# Patient Record
Sex: MALE | Race: BLACK OR AFRICAN AMERICAN | Employment: OTHER | URBAN - METROPOLITAN AREA
[De-identification: names, ages, dates, MRNs, and addresses within clinical notes are randomized per-mention and may not be internally consistent; named-entity substitution may affect disease eponyms.]

---

## 2017-03-21 ENCOUNTER — PATIENT OUTREACH (OUTPATIENT)
Dept: INTERNAL MEDICINE CLINIC | Age: 82
End: 2017-03-21

## 2017-03-21 NOTE — PROGRESS NOTES
Spoke with patient he verified identity with /ADDRESS. Patient listed with [employer] for HEDIS compliance. Patient notified Panel Manager he is being seen at [facility] ([facility]) AT [place] for a wound that is not healing well. Patient informed of need to be seen in PCP office to complete form. Patient stated he only sees [place] physicians for certain things and Dr. Sophia Bansal for certain things. Patient stated he thought [employer] was not an insurance. Patient encouraged to call [facility] for more information on his plan. Patient wants to be seen at the [place] for his next appointment then call PCP office to schedule appointment. Patient informed of importance to complete the needed appointment. Will attempt to get patient to schedule appointment as soon as possible.

## 2017-07-04 ENCOUNTER — APPOINTMENT (OUTPATIENT)
Dept: GENERAL RADIOLOGY | Age: 82
DRG: 292 | End: 2017-07-04
Attending: EMERGENCY MEDICINE
Payer: MEDICARE

## 2017-07-04 ENCOUNTER — APPOINTMENT (OUTPATIENT)
Dept: GENERAL RADIOLOGY | Age: 82
DRG: 292 | End: 2017-07-04
Attending: PHYSICIAN ASSISTANT
Payer: MEDICARE

## 2017-07-04 ENCOUNTER — HOSPITAL ENCOUNTER (INPATIENT)
Age: 82
LOS: 2 days | Discharge: HOME | DRG: 292 | End: 2017-07-06
Attending: EMERGENCY MEDICINE | Admitting: EMERGENCY MEDICINE
Payer: MEDICARE

## 2017-07-04 DIAGNOSIS — R06.03 RESPIRATORY DISTRESS: Primary | ICD-10-CM

## 2017-07-04 DIAGNOSIS — L03.119 RECURRENT CELLULITIS OF LOWER LEG: ICD-10-CM

## 2017-07-04 DIAGNOSIS — I50.9 ACUTE CONGESTIVE HEART FAILURE, UNSPECIFIED CONGESTIVE HEART FAILURE TYPE: ICD-10-CM

## 2017-07-04 DIAGNOSIS — I87.2 CHRONIC VENOUS INSUFFICIENCY: ICD-10-CM

## 2017-07-04 DIAGNOSIS — I10 MALIGNANT HYPERTENSION: ICD-10-CM

## 2017-07-04 LAB
ALBUMIN SERPL BCP-MCNC: 3.1 G/DL (ref 3.5–5)
ALBUMIN/GLOB SERPL: 0.7 {RATIO} (ref 1.1–2.2)
ALP SERPL-CCNC: 88 U/L (ref 45–117)
ALT SERPL-CCNC: 24 U/L (ref 12–78)
ANION GAP BLD CALC-SCNC: 6 MMOL/L (ref 5–15)
AST SERPL W P-5'-P-CCNC: 17 U/L (ref 15–37)
BASOPHILS # BLD AUTO: 0.1 K/UL (ref 0–0.1)
BASOPHILS # BLD: 1 % (ref 0–1)
BILIRUB SERPL-MCNC: 0.3 MG/DL (ref 0.2–1)
BNP SERPL-MCNC: 1109 PG/ML (ref 0–450)
BUN SERPL-MCNC: 13 MG/DL (ref 6–20)
BUN/CREAT SERPL: 10 (ref 12–20)
CALCIUM SERPL-MCNC: 8 MG/DL (ref 8.5–10.1)
CHLORIDE SERPL-SCNC: 103 MMOL/L (ref 97–108)
CK MB CFR SERPL CALC: 0.7 % (ref 0–2.5)
CK MB SERPL-MCNC: 1.4 NG/ML (ref 5–25)
CK SERPL-CCNC: 192 U/L (ref 39–308)
CO2 SERPL-SCNC: 32 MMOL/L (ref 21–32)
CREAT SERPL-MCNC: 1.24 MG/DL (ref 0.7–1.3)
EOSINOPHIL # BLD: 0.3 K/UL (ref 0–0.4)
EOSINOPHIL NFR BLD: 5 % (ref 0–7)
ERYTHROCYTE [DISTWIDTH] IN BLOOD BY AUTOMATED COUNT: 18.1 % (ref 11.5–14.5)
GLOBULIN SER CALC-MCNC: 4.7 G/DL (ref 2–4)
GLUCOSE SERPL-MCNC: 104 MG/DL (ref 65–100)
HCT VFR BLD AUTO: 35.7 % (ref 36.6–50.3)
HGB BLD-MCNC: 10.7 G/DL (ref 12.1–17)
LYMPHOCYTES # BLD AUTO: 27 % (ref 12–49)
LYMPHOCYTES # BLD: 1.6 K/UL (ref 0.8–3.5)
MCH RBC QN AUTO: 23.4 PG (ref 26–34)
MCHC RBC AUTO-ENTMCNC: 30 G/DL (ref 30–36.5)
MCV RBC AUTO: 78.1 FL (ref 80–99)
MONOCYTES # BLD: 0.6 K/UL (ref 0–1)
MONOCYTES NFR BLD AUTO: 9 % (ref 5–13)
NEUTS SEG # BLD: 3.6 K/UL (ref 1.8–8)
NEUTS SEG NFR BLD AUTO: 58 % (ref 32–75)
PLATELET # BLD AUTO: 294 K/UL (ref 150–400)
POTASSIUM SERPL-SCNC: 3.9 MMOL/L (ref 3.5–5.1)
PROT SERPL-MCNC: 7.8 G/DL (ref 6.4–8.2)
RBC # BLD AUTO: 4.57 M/UL (ref 4.1–5.7)
SODIUM SERPL-SCNC: 141 MMOL/L (ref 136–145)
TROPONIN I SERPL-MCNC: <0.04 NG/ML
WBC # BLD AUTO: 6.1 K/UL (ref 4.1–11.1)

## 2017-07-04 PROCEDURE — 74011250637 HC RX REV CODE- 250/637: Performed by: EMERGENCY MEDICINE

## 2017-07-04 PROCEDURE — 96376 TX/PRO/DX INJ SAME DRUG ADON: CPT

## 2017-07-04 PROCEDURE — 96374 THER/PROPH/DIAG INJ IV PUSH: CPT

## 2017-07-04 PROCEDURE — 84484 ASSAY OF TROPONIN QUANT: CPT | Performed by: PHYSICIAN ASSISTANT

## 2017-07-04 PROCEDURE — 71010 XR CHEST PORT: CPT

## 2017-07-04 PROCEDURE — 65660000000 HC RM CCU STEPDOWN

## 2017-07-04 PROCEDURE — 85025 COMPLETE CBC W/AUTO DIFF WBC: CPT | Performed by: PHYSICIAN ASSISTANT

## 2017-07-04 PROCEDURE — 82550 ASSAY OF CK (CPK): CPT | Performed by: PHYSICIAN ASSISTANT

## 2017-07-04 PROCEDURE — 74011250636 HC RX REV CODE- 250/636: Performed by: EMERGENCY MEDICINE

## 2017-07-04 PROCEDURE — 80053 COMPREHEN METABOLIC PANEL: CPT | Performed by: PHYSICIAN ASSISTANT

## 2017-07-04 PROCEDURE — 83880 ASSAY OF NATRIURETIC PEPTIDE: CPT | Performed by: PHYSICIAN ASSISTANT

## 2017-07-04 PROCEDURE — 93005 ELECTROCARDIOGRAM TRACING: CPT

## 2017-07-04 PROCEDURE — 99285 EMERGENCY DEPT VISIT HI MDM: CPT

## 2017-07-04 PROCEDURE — 36415 COLL VENOUS BLD VENIPUNCTURE: CPT | Performed by: EMERGENCY MEDICINE

## 2017-07-04 PROCEDURE — 74011250636 HC RX REV CODE- 250/636: Performed by: PHYSICIAN ASSISTANT

## 2017-07-04 PROCEDURE — 74011250637 HC RX REV CODE- 250/637: Performed by: PHYSICIAN ASSISTANT

## 2017-07-04 RX ORDER — ACETAMINOPHEN 325 MG/1
650 TABLET ORAL
Status: DISCONTINUED | OUTPATIENT
Start: 2017-07-04 | End: 2017-07-06 | Stop reason: HOSPADM

## 2017-07-04 RX ORDER — FUROSEMIDE 10 MG/ML
40 INJECTION INTRAMUSCULAR; INTRAVENOUS
Status: COMPLETED | OUTPATIENT
Start: 2017-07-04 | End: 2017-07-04

## 2017-07-04 RX ORDER — SODIUM CHLORIDE 0.9 % (FLUSH) 0.9 %
5-10 SYRINGE (ML) INJECTION AS NEEDED
Status: DISCONTINUED | OUTPATIENT
Start: 2017-07-04 | End: 2017-07-05 | Stop reason: SDUPTHER

## 2017-07-04 RX ORDER — SODIUM CHLORIDE 0.9 % (FLUSH) 0.9 %
5-10 SYRINGE (ML) INJECTION AS NEEDED
Status: DISCONTINUED | OUTPATIENT
Start: 2017-07-04 | End: 2017-07-06 | Stop reason: HOSPADM

## 2017-07-04 RX ORDER — LISINOPRIL 5 MG/1
10 TABLET ORAL DAILY
Status: DISCONTINUED | OUTPATIENT
Start: 2017-07-05 | End: 2017-07-06

## 2017-07-04 RX ORDER — FUROSEMIDE 10 MG/ML
40 INJECTION INTRAMUSCULAR; INTRAVENOUS 2 TIMES DAILY
Status: DISCONTINUED | OUTPATIENT
Start: 2017-07-04 | End: 2017-07-06 | Stop reason: HOSPADM

## 2017-07-04 RX ORDER — SODIUM CHLORIDE 0.9 % (FLUSH) 0.9 %
5-10 SYRINGE (ML) INJECTION EVERY 8 HOURS
Status: DISCONTINUED | OUTPATIENT
Start: 2017-07-04 | End: 2017-07-05 | Stop reason: SDUPTHER

## 2017-07-04 RX ORDER — ONDANSETRON 2 MG/ML
4 INJECTION INTRAMUSCULAR; INTRAVENOUS
Status: DISCONTINUED | OUTPATIENT
Start: 2017-07-04 | End: 2017-07-06 | Stop reason: HOSPADM

## 2017-07-04 RX ORDER — LATANOPROST 50 UG/ML
1 SOLUTION/ DROPS OPHTHALMIC
Status: DISCONTINUED | OUTPATIENT
Start: 2017-07-04 | End: 2017-07-06 | Stop reason: HOSPADM

## 2017-07-04 RX ORDER — METHADONE HYDROCHLORIDE 10 MG/1
10 TABLET ORAL 2 TIMES DAILY
Status: DISCONTINUED | OUTPATIENT
Start: 2017-07-04 | End: 2017-07-05

## 2017-07-04 RX ORDER — GUAIFENESIN 100 MG/5ML
200 SOLUTION ORAL
Status: DISPENSED | OUTPATIENT
Start: 2017-07-04 | End: 2017-07-05

## 2017-07-04 RX ORDER — GUAIFENESIN 100 MG/5ML
200 SOLUTION ORAL
Status: DISCONTINUED | OUTPATIENT
Start: 2017-07-04 | End: 2017-07-06 | Stop reason: HOSPADM

## 2017-07-04 RX ORDER — SODIUM CHLORIDE 0.9 % (FLUSH) 0.9 %
5-10 SYRINGE (ML) INJECTION EVERY 8 HOURS
Status: DISCONTINUED | OUTPATIENT
Start: 2017-07-04 | End: 2017-07-06 | Stop reason: HOSPADM

## 2017-07-04 RX ADMIN — GUAIFENESIN 200 MG: 100 SOLUTION ORAL at 23:01

## 2017-07-04 RX ADMIN — FUROSEMIDE 40 MG: 10 INJECTION, SOLUTION INTRAVENOUS at 18:48

## 2017-07-04 RX ADMIN — NITROGLYCERIN 1 INCH: 20 OINTMENT TOPICAL at 18:13

## 2017-07-04 RX ADMIN — ACETAMINOPHEN 650 MG: 325 TABLET ORAL at 22:16

## 2017-07-04 RX ADMIN — Medication 5 ML: at 22:19

## 2017-07-04 RX ADMIN — FUROSEMIDE 40 MG: 10 INJECTION, SOLUTION INTRAVENOUS at 18:13

## 2017-07-04 RX ADMIN — METHADONE HYDROCHLORIDE 10 MG: 10 TABLET ORAL at 22:15

## 2017-07-04 NOTE — IP AVS SNAPSHOT
[facility] 
 
 
 [address] Patient: Cliff Barron MRN: [id number] QXB:[birth year] You are allergic to the following Allergen Reactions Ace Inhibitors Cough Ibuprofen Rash Recent Documentation Height Weight BMI Smoking Status 1.702 m 118.3 kg 40.83 kg/m2 Never Smoker Emergency Contacts Name Discharge Info Relation Home Work Mobile [address]  Son [22] [phone] Yaya Beltre  Sister [23] [phone] Sandra Carpenter  Other Relative [6] [phone] About your hospitalization You were admitted on:  July 4, 2017 You last received care in the:  [address] You were discharged on:  July 6, 2017 Unit phone number:  [phone] Why you were hospitalized Your primary diagnosis was:  Chf (Congestive Heart Failure) (Hcc) Your diagnoses also included:  Htn (Hypertension), Obesity, Diabetes Mellitus (Hcc) Providers Seen During Your Hospitalizations Provider Role Specialty Primary office phone Miroslava Houston MD Attending Provider Emergency Medicine [phone] Jero Hein MD Attending Provider Emergency Medicine [phone] Carrie Weiss MD Attending Provider Internal Medicine [phone] Your Primary Care Physician (PCP) Primary Care Physician Office Phone Office Fax Helen Howard [phone] Follow-up Information Follow up With Details Comments Contact Info Audrey Fortune NP Go on 7/10/2017 Your appointment is scheduled for 7/10/17 at [place] [address] [facility] [id number] 
[phone] [facility] Office on [address] [facility]. will be contacted by a health  to schedule a home visit. [address] [facility] will be contacted with your [facility] and [facility] Medicine appointments within 48 hours. [address] [facility] [address] 
[phone] Donovan Wojciech on 7/13/2017 Your appointment is scheduled for 7/13/17 at 1:15pm. [address] [facility] [address] 
[phone] Teri Astorga MD   [facility] [address] [address] 
[phone] Your Appointments Monday July 10, 2017  9:00 AM EDT ACUTE CARE with Higinio Bowie NP  
[facility] [address] [facility] [address] [address]  
[phone] Current Discharge Medication List  
  
START taking these medications Dose & Instructions Dispensing Information Comments Morning Noon Evening Bedtime  
 losartan 50 mg tablet Commonly known as:  COZAAR Your last dose was: Your next dose is:    
   
   
 Dose:  50 mg Take 1 Tab by mouth daily. Quantity:  30 Tab Refills:  1 CONTINUE these medications which have NOT CHANGED Dose & Instructions Dispensing Information Comments Morning Noon Evening Bedtime ALOE VESTA 43 % Oint Generic drug:  White Petrolatum Your last dose was: Your next dose is:    
   
   
 by Apply Externally route daily as needed (rash). Refills:  0  
     
   
   
   
  
 aspirin 81 mg chewable tablet Your last dose was: Your next dose is:    
   
   
 Dose:  81 mg Take 81 mg by mouth daily. Refills:  0  
     
   
   
   
  
 cholecalciferol 1,000 unit tablet Commonly known as:  VITAMIN D3 Your last dose was: Your next dose is:    
   
   
 Dose:  3000 Units Take 3,000 Units by mouth daily. Refills:  0  
     
   
   
   
  
 ferrous sulfate 325 mg (65 mg iron) tablet Your last dose was: Your next dose is:    
   
   
 Dose:  325 mg Take 325 mg by mouth daily. Refills:  0  
     
   
   
   
  
 furosemide 40 mg tablet Commonly known as:  LASIX Your last dose was: Your next dose is:    
   
   
 Dose:  40 mg Take 40 mg by mouth daily. Refills:  0  
     
   
   
   
  
 gabapentin 600 mg tablet Commonly known as:  NEURONTIN Your last dose was: Your next dose is:    
   
   
 Dose:  600 mg Take 600 mg by mouth nightly. Refills:  0  
     
   
   
   
  
 latanoprost 0.005 % ophthalmic solution Commonly known as:  Jose Tavares Your last dose was: Your next dose is:    
   
   
 Dose:  1 Drop Administer 1 drop to both eyes nightly. Refills:  0  
     
   
   
   
  
 methadone 10 mg tablet Commonly known as:  DOLOPHINE Your last dose was: Your next dose is:    
   
   
 Dose:  20 mg Take 20 mg by mouth two (2) times a day. Refills:  0  
     
   
   
   
  
 OTHER(NON-FORMULARY) Your last dose was: Your next dose is:    
   
   
 Apply  to affected area daily. Sodium lactate-urea 5% Refills:  0  
     
   
   
   
  
 polyvinyl alcohol 1.4 % ophthalmic solution Commonly known as:  Ledell [place] Your last dose was: Your next dose is:    
   
   
 Dose:  1 Drop Administer 1 Drop to both eyes four (4) times daily as needed (dry eye). Refills:  0 PRAVACHOL 40 mg tablet Generic drug:  pravastatin Your last dose was: Your next dose is:    
   
   
 Dose:  40 mg Take 40 mg by mouth nightly. Refills:  0 STOP taking these medications   
 hydrOXYzine pamoate 25 mg capsule Commonly known as:  VISTARIL  
   
  
 lisinopril 40 mg tablet Commonly known as:  Leydi Kwon Where to Get Your Medications Information on where to get these meds will be given to you by the nurse or doctor. ! Ask your nurse or doctor about these medications  
  losartan 50 mg tablet Discharge Instructions None Discharge Instructions Attachments/References HEART FAILURE (ENGLISH) Discharge Orders None WeLike Announcement We are excited to announce that we are making your provider's discharge notes available to you in WeLike. You will see these notes when they are completed and signed by the physician that discharged you from your recent hospital stay. If you have any questions or concerns about any information you see in WeLike, please call the Health Information Department where you were seen or reach out to your Primary Care Provider for more information about your plan of care. Introducing [facility]! Manuel Lezama introduces WeLike patient portal. Now you can access parts of your medical record, email your doctor's office, and request medication refills online. 1. In your internet browser, go to [email or web address] 2. Click on the First Time User? Click Here link in the Sign In box. You will see the New Member Sign Up page. 3. Enter your WeLike Access Code exactly as it appears below. You will not need to use this code after youve completed the sign-up process. If you do not sign up before the expiration date, you must request a new code. · WeLike Access Code: [email or web address] Expires: 10/4/2017  3:33 PM 
 
4. Enter the last four digits of your Social Security Number (xxxx) and Date of Birth (mm/dd/yyyy) as indicated and click Submit. You will be taken to the next sign-up page. 5. Create a WeLike ID. This will be your WeLike login ID and cannot be changed, so think of one that is secure and easy to remember. 6. Create a WeLike password. You can change your password at any time. 7. Enter your Password Reset Question and Answer. This can be used at a later time if you forget your password. 8. Enter your e-mail address. You will receive e-mail notification when new information is available in [address] 9. Click Sign Up. You can now view and download portions of your medical record. 10. Click the Download Summary menu link to download a portable copy of your medical information. If you have questions, please visit the Frequently Asked Questions section of the [employer] website. Remember, [employer] is NOT to be used for urgent needs. For medical emergencies, dial 911. Now available from your iPhone and Android! General Information Please provide this summary of care documentation to your next provider. Patient Signature:  ____________________________________________________________ Date:  ____________________________________________________________  
  
Temitope Garza Provider Signature:  ____________________________________________________________ Date:  ____________________________________________________________ More Information Heart Failure: Care Instructions Your Care Instructions Heart failure occurs when your heart does not pump as much blood as the body needs. Failure does not mean that the heart has stopped pumping but rather that it is not pumping as well as it should. Over time, this causes fluid buildup in your lungs and other parts of your body. Fluid buildup can cause shortness of breath, fatigue, swollen ankles, and other problems. By taking medicines regularly, reducing sodium (salt) in your diet, checking your weight every day, and making lifestyle changes, you can feel better and live longer. Follow-up care is a key part of your treatment and safety. Be sure to make and go to all appointments, and call your doctor if you are having problems. It's also a good idea to know your test results and keep a list of the medicines you take. How can you care for yourself at home? Medicines · Be safe with medicines. Take your medicines exactly as prescribed. Call your doctor if you think you are having a problem with your medicine. · Do not take any vitamins, over-the-counter medicine, or herbal products without talking to your doctor first. Annette Dial not take ibuprofen (Advil or Motrin) and naproxen (Aleve) without talking to your doctor first. They could make your heart failure worse. · You may be taking some of the following medicine. ¨ Beta-blockers can slow heart rate, decrease blood pressure, and improve your condition. Taking a beta-blocker may lower your chance of needing to be hospitalized. ¨ Angiotensin-converting enzyme inhibitors (ACEIs) reduce the heart's workload, lower blood pressure, and reduce swelling. Taking an ACEI may lower your chance of needing to be hospitalized again. ¨ Angiotensin II receptor blockers (ARBs) work like ACEIs. Your doctor may prescribe them instead of ACEIs. ¨ Diuretics, also called water pills, reduce swelling. ¨ Potassium supplements replace this important mineral, which is sometimes lost with diuretics. ¨ Aspirin and other blood thinners prevent blood clots, which can cause a stroke or heart attack. You will get more details on the specific medicines your doctor prescribes. Diet · Your doctor may suggest that you limit sodium to 2,000 milligrams (mg) a day or less. That is less than 1 teaspoon of salt a day, including all the salt you eat in cooking or in packaged foods. People get most of their sodium from processed foods. Fast food and restaurant meals also tend to be very high in sodium. · Ask your doctor how much liquid you can drink each day. You may have to limit liquids. Weight · Weigh yourself without clothing at the same time each day. Record your weight. Call your doctor if you have a sudden weight gain, such as more than 2 to 3 pounds in a day or 5 pounds in a week. (Your doctor may suggest a different range of weight gain.) A sudden weight gain may mean that your heart failure is getting worse. Activity level · Start light exercise (if your doctor says it is okay).  Even if you can only do a small amount, exercise will help you get stronger, have more energy, and manage your weight and your stress. Walking is an easy way to get exercise. Start out by walking a little more than you did before. Bit by bit, increase the amount you walk. · When you exercise, watch for signs that your heart is working too hard. You are pushing yourself too hard if you cannot talk while you are exercising. If you become short of breath or dizzy or have chest pain, stop, sit down, and rest. 
· If you feel \"wiped out\" the day after you exercise, walk slower or for a shorter distance until you can work up to a better pace. · Get enough rest at night. Sleeping with 1 or 2 pillows under your upper body and head may help you breathe easier. Lifestyle changes · Do not smoke. Smoking can make a heart condition worse. If you need help quitting, talk to your doctor about stop-smoking programs and medicines. These can increase your chances of quitting for good. Quitting smoking may be the most important step you can take to protect your heart. · Limit alcohol to 2 drinks a day for men and 1 drink a day for women. Too much alcohol can cause health problems. · Avoid getting sick from colds and the flu. Get a pneumococcal vaccine shot. If you have had one before, ask your doctor whether you need another dose. Get a flu shot each year. If you must be around people with colds or the flu, wash your hands often. When should you call for help? Call 911 if you have symptoms of sudden heart failure such as: 
· You have severe trouble breathing. · You cough up pink, foamy mucus. · You have a new irregular or rapid heartbeat. Call your doctor now or seek immediate medical care if: 
· You have new or increased shortness of breath. · You are dizzy or lightheaded, or you feel like you may faint.  
· You have sudden weight gain, such as more than 2 to 3 pounds in a day or 5 pounds in a week. (Your doctor may suggest a different range of weight gain.) · You have increased swelling in your legs, ankles, or feet. · You are suddenly so tired or weak that you cannot do your usual activities. Watch closely for changes in your health, and be sure to contact your doctor if: 
· You develop new symptoms. Where can you learn more? Go to http://liz-josé luis.info/. Enter P501 in the search box to learn more about \"Heart Failure: Care Instructions. \" Current as of: April 3, 2017 Content Version: 11.3 © 2342-4447 [employer]. Care instructions adapted under license by [employer] (which disclaims liability or warranty for this information). If you have questions about a medical condition or this instruction, always ask your healthcare professional. [address] any warranty or liability for your use of this information.

## 2017-07-04 NOTE — ED NOTES
Pt c/o SOB for the pass two day and + wheezing and coughing. Emergency Department Nursing Plan of Care       The Nursing Plan of Care is developed from the Nursing assessment and Emergency Department Attending provider initial evaluation. The plan of care may be reviewed in the ED Provider note.     The Plan of Care was developed with the following considerations:   Patient / Family readiness to learn indicated by:verbalized understanding  Persons(s) to be included in education: patient  Barriers to Learning/Limitations:No    Signed     Bayron Rai RN    7/4/2017   5:47 PM

## 2017-07-04 NOTE — ED NOTES
Bedside shift change report given to Tere Patricia RN (oncoming nurse) by EDWARDO Humphries RN (offgoing nurse). Report included the following information SBAR, ED Summary, MAR and Recent Results. Pt noted to be resting comfortably. Pt updated on plan of care, has no questions or concerns at this time.

## 2017-07-04 NOTE — ED TRIAGE NOTES
C/o shortness of breath/wheezing with intermittent chest pain x 3 days with dull headache starting today, pt uses wheelchair/cane r/t BLE swelling per pt

## 2017-07-05 LAB
ANION GAP BLD CALC-SCNC: 7 MMOL/L (ref 5–15)
BNP SERPL-MCNC: 1120 PG/ML (ref 0–450)
BUN SERPL-MCNC: 16 MG/DL (ref 6–20)
BUN/CREAT SERPL: 13 (ref 12–20)
CALCIUM SERPL-MCNC: 8.2 MG/DL (ref 8.5–10.1)
CHLORIDE SERPL-SCNC: 103 MMOL/L (ref 97–108)
CO2 SERPL-SCNC: 33 MMOL/L (ref 21–32)
CREAT SERPL-MCNC: 1.25 MG/DL (ref 0.7–1.3)
GLUCOSE BLD STRIP.AUTO-MCNC: 100 MG/DL (ref 65–100)
GLUCOSE BLD STRIP.AUTO-MCNC: 107 MG/DL (ref 65–100)
GLUCOSE BLD STRIP.AUTO-MCNC: 131 MG/DL (ref 65–100)
GLUCOSE BLD STRIP.AUTO-MCNC: 142 MG/DL (ref 65–100)
GLUCOSE SERPL-MCNC: 103 MG/DL (ref 65–100)
MAGNESIUM SERPL-MCNC: 1.9 MG/DL (ref 1.6–2.4)
POTASSIUM SERPL-SCNC: 3.7 MMOL/L (ref 3.5–5.1)
SERVICE CMNT-IMP: ABNORMAL
SERVICE CMNT-IMP: NORMAL
SODIUM SERPL-SCNC: 143 MMOL/L (ref 136–145)
TROPONIN I SERPL-MCNC: <0.04 NG/ML

## 2017-07-05 PROCEDURE — 94640 AIRWAY INHALATION TREATMENT: CPT

## 2017-07-05 PROCEDURE — 82962 GLUCOSE BLOOD TEST: CPT

## 2017-07-05 PROCEDURE — 83735 ASSAY OF MAGNESIUM: CPT

## 2017-07-05 PROCEDURE — 80048 BASIC METABOLIC PNL TOTAL CA: CPT

## 2017-07-05 PROCEDURE — 36415 COLL VENOUS BLD VENIPUNCTURE: CPT

## 2017-07-05 PROCEDURE — 74011000250 HC RX REV CODE- 250: Performed by: EMERGENCY MEDICINE

## 2017-07-05 PROCEDURE — 93306 TTE W/DOPPLER COMPLETE: CPT

## 2017-07-05 PROCEDURE — 74011250637 HC RX REV CODE- 250/637: Performed by: EMERGENCY MEDICINE

## 2017-07-05 PROCEDURE — 74011250636 HC RX REV CODE- 250/636: Performed by: EMERGENCY MEDICINE

## 2017-07-05 PROCEDURE — 83880 ASSAY OF NATRIURETIC PEPTIDE: CPT

## 2017-07-05 PROCEDURE — 65660000000 HC RM CCU STEPDOWN

## 2017-07-05 PROCEDURE — 84484 ASSAY OF TROPONIN QUANT: CPT

## 2017-07-05 RX ORDER — GABAPENTIN 600 MG/1
600 TABLET ORAL
COMMUNITY

## 2017-07-05 RX ORDER — LANOLIN ALCOHOL/MO/W.PET/CERES
325 CREAM (GRAM) TOPICAL DAILY
COMMUNITY

## 2017-07-05 RX ORDER — POLYVINYL ALCOHOL 14 MG/ML
1 SOLUTION/ DROPS OPHTHALMIC
COMMUNITY

## 2017-07-05 RX ORDER — GUAIFENESIN 100 MG/5ML
81 LIQUID (ML) ORAL DAILY
COMMUNITY

## 2017-07-05 RX ORDER — LISINOPRIL 40 MG/1
20 TABLET ORAL DAILY
COMMUNITY
End: 2017-07-06

## 2017-07-05 RX ORDER — IPRATROPIUM BROMIDE AND ALBUTEROL SULFATE 2.5; .5 MG/3ML; MG/3ML
3 SOLUTION RESPIRATORY (INHALATION)
Status: DISCONTINUED | OUTPATIENT
Start: 2017-07-05 | End: 2017-07-06 | Stop reason: HOSPADM

## 2017-07-05 RX ORDER — HYDROXYZINE PAMOATE 25 MG/1
25 CAPSULE ORAL
COMMUNITY
End: 2017-07-06

## 2017-07-05 RX ORDER — GUAIFENESIN 100 MG/5ML
81 LIQUID (ML) ORAL DAILY
Status: DISCONTINUED | OUTPATIENT
Start: 2017-07-05 | End: 2017-07-06 | Stop reason: HOSPADM

## 2017-07-05 RX ORDER — PRAVASTATIN SODIUM 40 MG/1
40 TABLET ORAL
COMMUNITY

## 2017-07-05 RX ORDER — FUROSEMIDE 10 MG/ML
40 INJECTION INTRAMUSCULAR; INTRAVENOUS
Status: COMPLETED | OUTPATIENT
Start: 2017-07-05 | End: 2017-07-05

## 2017-07-05 RX ADMIN — FUROSEMIDE 40 MG: 10 INJECTION, SOLUTION INTRAVENOUS at 03:55

## 2017-07-05 RX ADMIN — FUROSEMIDE 40 MG: 10 INJECTION, SOLUTION INTRAVENOUS at 11:05

## 2017-07-05 RX ADMIN — FUROSEMIDE 40 MG: 10 INJECTION, SOLUTION INTRAVENOUS at 17:33

## 2017-07-05 RX ADMIN — GUAIFENESIN 200 MG: 100 SOLUTION ORAL at 17:33

## 2017-07-05 RX ADMIN — Medication 5 ML: at 06:34

## 2017-07-05 RX ADMIN — GUAIFENESIN 200 MG: 100 SOLUTION ORAL at 02:40

## 2017-07-05 RX ADMIN — Medication 10 ML: at 17:34

## 2017-07-05 RX ADMIN — Medication 10 ML: at 08:47

## 2017-07-05 RX ADMIN — METHADONE HYDROCHLORIDE 10 MG: 10 TABLET ORAL at 08:46

## 2017-07-05 RX ADMIN — GUAIFENESIN 200 MG: 100 SOLUTION ORAL at 06:34

## 2017-07-05 RX ADMIN — IPRATROPIUM BROMIDE AND ALBUTEROL SULFATE 3 ML: .5; 3 SOLUTION RESPIRATORY (INHALATION) at 03:18

## 2017-07-05 RX ADMIN — ASPIRIN 81 MG: 81 TABLET, CHEWABLE ORAL at 11:05

## 2017-07-05 RX ADMIN — LISINOPRIL 10 MG: 5 TABLET ORAL at 08:46

## 2017-07-05 RX ADMIN — Medication 5 ML: at 22:11
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
## 2017-07-05 NOTE — PROGRESS NOTES
TRANSFER - IN REPORT:    Verbal report received from [facility] (name) on Bayron Major  being received from ED(unit) for routine progression of care      Report consisted of patients Situation, Background, Assessment and   Recommendations(SBAR). Information from the following report(s) SBAR, ED Summary, [facility] and Recent Results was reviewed with the receiving nurse. Opportunity for questions and clarification was provided. Assessment completed upon patients arrival to unit and care assumed. 0300 pt noted to have GALVAN and productive cough with ins/exp wheezing . Dr. Guerrero Pearl mad aware and orders received .   4084 pt receiving Jet neb treatment , labs pending . 1248 pt continues to be diuresed , tolerating well .     0725 Bedside shift change report given to [address] [place] [address]  (oncoming nurse) by me (offgoing nurse). Report given with SBAR, MAR and Recent Results.

## 2017-07-05 NOTE — CDMP QUERY
Hypertensive heart disease POA in setting of Ac on ch diastolic HF requiring NTP, Lasix IV  =>Other Explanation of clinical findings  =>Unable to Determine (no explanation of clinical findings)    The medical record reflects the following clinical findings, treatment, and risk factors:    Risk Factors: 82 BM w/hx obese, DM2, CHF EF 50%, HTN, presented w/inc SOB x 3 days  Clinical Indicators: /100- 131/85, pro BNP 1109, CXR w/Cardiomegaly w/o CHF, mild discoid ATX in the LLL  Treatment: as above, card cx, DM diet, I/O    Please clarify and document your clinical opinion in the progress notes and discharge summary including the definitive and/or presumptive diagnosis, (suspected or probable), related to the above clinical findings. Please include clinical findings supporting your diagnosis.   Thank Fiorella Hancock Scales   [phone]
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
## 2017-07-05 NOTE — ROUTINE PROCESS
TRANSFER - OUT REPORT:    Verbal report given to RN Elizabeth (name) on Erendira Ortiz  being transferred to [facility](unit) for routine progression of care       Report consisted of patients Situation, Background, Assessment and   Recommendations(SBAR). Information from the following report(s) SBAR, ED Summary, Intake/Output, MAR and Recent Results was reviewed with the receiving nurse. Lines:   Peripheral IV 07/04/17 Left Antecubital (Active)   Site Assessment Clean, dry, & intact 7/4/2017  5:50 PM   Phlebitis Assessment 0 7/4/2017  5:50 PM   Infiltration Assessment 0 7/4/2017  5:50 PM   Dressing Status Clean, dry, & intact 7/4/2017  5:50 PM   Dressing Type Transparent 7/4/2017  5:50 PM   Hub Color/Line Status Pink;Flushed;Patent 7/4/2017  5:50 PM   Action Taken Blood drawn 7/4/2017  5:50 PM        Opportunity for questions and clarification was provided.       Patient transported with:   Monitor  Registered Nurse

## 2017-07-05 NOTE — PROGRESS NOTES
Bedside/verbal report received from off going nurse Dee. Amanda Gonsales .Bedside and Verbal shift change report given to [address] (oncoming nurse) by Carmella Daniels (offgoing nurse). Report included the following information SBAR, Kardex, Intake/Output, MAR, Recent Results and Cardiac Rhythm NSR.

## 2017-07-05 NOTE — CDMP QUERY
Patient is noted to have a BMI of 41.43. Please clarify if this patient is:     =>Morbidly obese (BMI ³ 40)  =>Obese (BMI 30 - 39.9)  =>Overweight (BMI 25 - 29.9)  =>Other explanation of clinical findings  =>Unable to determine (no explanation for clinical findings)    Presentation: 5'7\", 272 lbs = BMI 41.43    REFERENCE:  The [address] [place] [address] has issued a statement indicating that, \"Individuals who are overweight, obese, or morbidly obese are at an increased risk for certain medical conditions when compared to persons of normal weight. Therefore, these conditions are always clinically significant and reportable when documented by the provider. Please clarify and document your clinical opinion in the progress notes and discharge summary including the definitive and/or presumptive diagnosis, (suspected or probable), related to the above clinical findings. Please include clinical findings supporting your diagnosis.   Thank Elissa Smith   [phone]

## 2017-07-05 NOTE — PROGRESS NOTES
RRAT Score: 22  Initial Assessment: CM reviewed chart and met with patient for discharge planning. CM verified patients address and contact number as correct on the facesheet. Pt presented to ED with CHF. Patient is currently living alone. Patient is retired. Patient consented for CM to make appointment arrangements. Patients PCP is LIN Santiago. He was previously seeing Dr. Corbin Garnett. Patient's appointment is scheduled for 7/10/17 at [address] [place] [address]. He reported that he is also seen in the red clinic at [facility]. Patient will not need assistance with obtaining medications. Patient voiced that he has not used the pharmacy to obtain medications in quite a while. Medications refills will likely be needed on discharge. Patient uses [facility] to obtain medications. He reported using the pharmacy when it was [facility]. Patient has a motorized wheelchair that he has at his bedside. Patient reported being willing to ride his wheelchair home at discharge. He was agreeable to 's offer of [employer]. Patient reported that he is not on home oxygen. Patient is currently on oxygen while in the hospital. He reported that he believes that he may also need a sleep study. CM reviewed IM form with patient and he provided verbal consent. Emergency Contact:   Lalitha Zhao (son) [phone]  Pertinent Medical Hx: see H&P     Transition Plan: Home with outpatient services. Involve patient/caregiver in assessment, planning, education and implement of intervention. Yes. CM will continue to follow case for discharge planning. CM daily patient care huddles/interdisciplinary rounds. Rounded with IDT. CM will handoff to [address] [place] [address] or PCP practice. [facility] or [address] coordination of resources. CM will further assess if needed. Care Management Interventions  PCP Verified by CM:  Yes  Palliative Care Consult (Criteria: CHF and RRAT>21): No  Mode of Transport at Discharge:  Other (see comment) ([facility])  Transition of Care Consult (CM Consult): Discharge Planning  Discharge Durable Medical Equipment: No (Patient has motorized wheelchair)  Physical Therapy Consult: No  Occupational Therapy Consult: No  Speech Therapy Consult: No  Current Support Network: Lives Alone  Confirm Follow Up Transport: Self  Discharge Location  Discharge Placement: Home with outpatient services    [address]

## 2017-07-06 VITALS
OXYGEN SATURATION: 93 % | SYSTOLIC BLOOD PRESSURE: 158 MMHG | DIASTOLIC BLOOD PRESSURE: 90 MMHG | HEART RATE: 82 BPM | WEIGHT: 260.7 LBS | HEIGHT: 67 IN | TEMPERATURE: 98 F | RESPIRATION RATE: 18 BRPM | BODY MASS INDEX: 40.92 KG/M2

## 2017-07-06 LAB
ATRIAL RATE: 89 BPM
ATRIAL RATE: 94 BPM
CALCULATED P AXIS, ECG09: 55 DEGREES
CALCULATED P AXIS, ECG09: 59 DEGREES
CALCULATED R AXIS, ECG10: -35 DEGREES
CALCULATED R AXIS, ECG10: -37 DEGREES
CALCULATED T AXIS, ECG11: -125 DEGREES
CALCULATED T AXIS, ECG11: 160 DEGREES
DIAGNOSIS, 93000: NORMAL
DIAGNOSIS, 93000: NORMAL
GLUCOSE BLD STRIP.AUTO-MCNC: 163 MG/DL (ref 65–100)
GLUCOSE BLD STRIP.AUTO-MCNC: 97 MG/DL (ref 65–100)
P-R INTERVAL, ECG05: 170 MS
P-R INTERVAL, ECG05: 206 MS
Q-T INTERVAL, ECG07: 380 MS
Q-T INTERVAL, ECG07: 392 MS
QRS DURATION, ECG06: 90 MS
QRS DURATION, ECG06: 94 MS
QTC CALCULATION (BEZET), ECG08: 462 MS
QTC CALCULATION (BEZET), ECG08: 490 MS
SERVICE CMNT-IMP: ABNORMAL
SERVICE CMNT-IMP: NORMAL
VENTRICULAR RATE, ECG03: 89 BPM
VENTRICULAR RATE, ECG03: 94 BPM

## 2017-07-06 PROCEDURE — 74011250637 HC RX REV CODE- 250/637: Performed by: EMERGENCY MEDICINE

## 2017-07-06 PROCEDURE — 74011250637 HC RX REV CODE- 250/637: Performed by: HOSPITALIST

## 2017-07-06 PROCEDURE — 74011250636 HC RX REV CODE- 250/636: Performed by: HOSPITALIST

## 2017-07-06 PROCEDURE — 74011250637 HC RX REV CODE- 250/637: Performed by: INTERNAL MEDICINE

## 2017-07-06 PROCEDURE — 74011000250 HC RX REV CODE- 250: Performed by: EMERGENCY MEDICINE

## 2017-07-06 PROCEDURE — 74011250636 HC RX REV CODE- 250/636: Performed by: EMERGENCY MEDICINE

## 2017-07-06 PROCEDURE — 82962 GLUCOSE BLOOD TEST: CPT

## 2017-07-06 RX ORDER — LISINOPRIL 20 MG/1
40 TABLET ORAL DAILY
Status: DISCONTINUED | OUTPATIENT
Start: 2017-07-07 | End: 2017-07-06 | Stop reason: DRUGHIGH

## 2017-07-06 RX ORDER — HYDRALAZINE HYDROCHLORIDE 20 MG/ML
20 INJECTION INTRAMUSCULAR; INTRAVENOUS
Status: DISCONTINUED | OUTPATIENT
Start: 2017-07-06 | End: 2017-07-06 | Stop reason: HOSPADM

## 2017-07-06 RX ORDER — VALSARTAN 80 MG/1
160 TABLET ORAL DAILY
Status: DISCONTINUED | OUTPATIENT
Start: 2017-07-06 | End: 2017-07-06 | Stop reason: HOSPADM

## 2017-07-06 RX ORDER — METHADONE HYDROCHLORIDE 5 MG/1
35 TABLET ORAL DAILY
Status: DISCONTINUED | OUTPATIENT
Start: 2017-07-06 | End: 2017-07-06 | Stop reason: HOSPADM

## 2017-07-06 RX ORDER — LISINOPRIL 20 MG/1
20 TABLET ORAL DAILY
Status: DISCONTINUED | OUTPATIENT
Start: 2017-07-07 | End: 2017-07-06

## 2017-07-06 RX ORDER — MAGNESIUM SULFATE 1 G/100ML
1 INJECTION INTRAVENOUS ONCE
Status: COMPLETED | OUTPATIENT
Start: 2017-07-06 | End: 2017-07-06

## 2017-07-06 RX ORDER — BENZONATATE 100 MG/1
100 CAPSULE ORAL
Status: DISCONTINUED | OUTPATIENT
Start: 2017-07-06 | End: 2017-07-06 | Stop reason: HOSPADM

## 2017-07-06 RX ORDER — AMLODIPINE BESYLATE 5 MG/1
5 TABLET ORAL DAILY
Status: DISCONTINUED | OUTPATIENT
Start: 2017-07-06 | End: 2017-07-06 | Stop reason: HOSPADM

## 2017-07-06 RX ORDER — LOSARTAN POTASSIUM 50 MG/1
50 TABLET ORAL DAILY
Qty: 30 TAB | Refills: 1 | Status: SHIPPED | OUTPATIENT
Start: 2017-07-06

## 2017-07-06 RX ORDER — PRAVASTATIN SODIUM 40 MG/1
40 TABLET ORAL
Status: DISCONTINUED | OUTPATIENT
Start: 2017-07-06 | End: 2017-07-06 | Stop reason: HOSPADM

## 2017-07-06 RX ORDER — LISINOPRIL 5 MG/1
10 TABLET ORAL ONCE
Status: COMPLETED | OUTPATIENT
Start: 2017-07-06 | End: 2017-07-06

## 2017-07-06 RX ORDER — LABETALOL 100 MG/1
100 TABLET, FILM COATED ORAL EVERY 12 HOURS
Status: DISCONTINUED | OUTPATIENT
Start: 2017-07-06 | End: 2017-07-06

## 2017-07-06 RX ADMIN — IPRATROPIUM BROMIDE AND ALBUTEROL SULFATE 3 ML: .5; 3 SOLUTION RESPIRATORY (INHALATION) at 11:51

## 2017-07-06 RX ADMIN — LISINOPRIL 10 MG: 5 TABLET ORAL at 10:42

## 2017-07-06 RX ADMIN — AMLODIPINE BESYLATE 5 MG: 5 TABLET ORAL at 10:41

## 2017-07-06 RX ADMIN — LISINOPRIL 10 MG: 5 TABLET ORAL at 08:21

## 2017-07-06 RX ADMIN — FUROSEMIDE 40 MG: 10 INJECTION, SOLUTION INTRAVENOUS at 08:22

## 2017-07-06 RX ADMIN — ASPIRIN 81 MG: 81 TABLET, CHEWABLE ORAL at 08:21

## 2017-07-06 RX ADMIN — VALSARTAN 160 MG: 80 TABLET ORAL at 16:30

## 2017-07-06 RX ADMIN — METHADONE HYDROCHLORIDE 35 MG: 5 TABLET ORAL at 09:22

## 2017-07-06 RX ADMIN — Medication 5 ML: at 07:03

## 2017-07-06 RX ADMIN — MAGNESIUM SULFATE HEPTAHYDRATE 1 G: 1 INJECTION, SOLUTION INTRAVENOUS at 10:41
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
## 2017-07-06 NOTE — PROGRESS NOTES
Hospitalist Progress Note    NAME: Blas Gutierrez   :  1935   MRN:  [id number]       Assessment / Plan:    Shortness of breath: POA  H/o HF with low normal EF in 3811, likely diastolic HF  With cough and congestion likely acute bronchitis  -I and O  -daily weight  -on IV diuretics  -updated echo, pending  -cardiology consult, pending  -prn mucolytics    Type 2 diabetes mellitus:   -diet controlled    Hypertension, POA  -resume her home medications    Chronic pain syndrome:   -continue Methadone, dose confirmed    Chronic leg edema, right left  Chronic right foot ulcer  Is being treated at [place] nad has a CT schedule as outpt  -on lasix  -PT/OT eval    Morbid obesity  Body mass index is 40.83 kg/(m^2). Code Status: full  Surrogate Decision Maker: does not want to decide right now     DVT Prophylaxis: lovenox  GI Prophylaxis: not indicated     Baseline: independent         Subjective:     Chief Complaint / Reason for Physician Visit  \"cough\". Discussed with RN events overnight. Review of Systems:  Symptom Y/N Comments  Symptom Y/N Comments   Fever/Chills n   Chest Pain n    Poor Appetite n   Edema y    Cough y   Abdominal Pain n    Sputum y   Joint Pain n    SOB/GALVAN n   Pruritis/Rash     Nausea/vomit n   Tolerating PT/OT y    Diarrhea    Tolerating Diet y    Constipation    Other       Could NOT obtain due to:      Objective:     VITALS:   Last 24hrs VS reviewed since prior progress note.  Most recent are:  Patient Vitals for the past 24 hrs:   Temp Pulse Resp BP SpO2   17 0740 98.4 °F (36.9 °C) 61 18 (!) 174/95 96 %   17 0508 98 °F (36.7 °C) 80 17 (!) 141/94 95 %   17 2319 98 °F (36.7 °C) 83 17 (!) 198/114 96 %   17 1938 98.2 °F (36.8 °C) 77 17 (!) 182/93 93 %   17 1548 98.2 °F (36.8 °C) 71 18 (!) 157/106 95 %   17 1110 96.5 °F (35.8 °C) 63 18 (!) 162/98 96 %       Intake/Output Summary (Last 24 hours) at 17 1002  Last data filed at 17 9819   Gross per 24 hour   Intake              590 ml   Output             2100 ml   Net            -1510 ml        PHYSICAL EXAM:  General: WD, WN. Alert, cooperative, no acute distress    EENT:  EOMI. Anicteric sclerae. MMM  Resp:  CTA bilaterally, no wheezing or rales. No accessory muscle use  CV:  Regular  rhythm,  No edema  GI:  Soft, Non distended, Non tender.  +Bowel sounds  Neurologic:  Alert and oriented X 3, normal speech,   Psych:   Good insight. Not anxious nor agitated  Skin:  No rashes. No jaundice    Reviewed most current lab test results and cultures  YES  Reviewed most current radiology test results   YES  Review and summation of old records today    NO  Reviewed patient's current orders and MAR    YES  PMH/SH reviewed - no change compared to H&P  ________________________________________________________________________  Care Plan discussed with:    Comments   Patient     Family      RN     Care Manager     Consultant                        Multidiciplinary team rounds were held today with , nursing, pharmacist and clinical coordinator. Patient's plan of care was discussed; medications were reviewed and discharge planning was addressed. ________________________________________________________________________  Total NON critical care TIME:  38   Minutes    Total CRITICAL CARE TIME Spent:   Minutes non procedure based      Comments   >50% of visit spent in counseling and coordination of care     ________________________________________________________________________  Akilah Azar MD     Procedures: see electronic medical records for all procedures/Xrays and details which were not copied into this note but were reviewed prior to creation of Plan. LABS:  I reviewed today's most current labs and imaging studies.   Pertinent labs include:  Recent Labs      07/04/17   1754   WBC  6.1   HGB  10.7*   HCT  35.7*   PLT  294     Recent Labs      07/05/17   0251  07/04/17   1754   NA  143  141   K  3.7  3.9 CL  103  103   CO2  33*  32   GLU  103*  104*   BUN  16  13   CREA  1.25  1.24   CA  8.2*  8.0*   MG  1.9   --    ALB   --   3.1*   TBILI   --   0.3   SGOT   --   17   ALT   --   24       Signed: Rogelio Perez MD

## 2017-07-06 NOTE — PROGRESS NOTES
Pt discharged to home, instructions reviewed with pt and copies given along with prescription for Diovan. Pt's IV & telemetry removed, opportunity for questions provided.

## 2017-07-06 NOTE — DISCHARGE SUMMARY
Hospitalist Discharge Summary     Patient ID:  Tavia Age  [id number]  81 y.o.  1935    PCP on record: Pilar Kasper MD    Admit date: 7/4/2017  Discharge date and time: 7/6/2017      DISCHARGE DIAGNOSIS:    Shortness of breath  H/o HF with low normal EF in 4788, likely diastolic HF  cough  acute bronchitis  Type 2 diabetes mellitus  Hypertension  Chronic pain syndrome  Chronic leg edema, right left  Chronic right foot ulcer  Morbid obesity  Body mass index is 40.83 kg/(m^2). CONSULTATIONS:  IP CONSULT TO HOSPITALIST  IP CONSULT TO CARDIOLOGY    Excerpted HPI from H&P of Geovanna Le MD:    Admitted by tele hospitalist overnight and seen by me this morning. Kelley Conde is a 81 y.o. with obese, HTN, chronic pain, diet control diabetes, with h/o HF, last EF in 2015 EF 50% presented to ER with h/o increased SOB for 3 days. C/o associated cough with some yellow sputum but no fever or chills. He is a [place] patient, was seen in clinic last week and everything was OK. Denies sick contacts or recent hospitalization. Denies active smoking but is an ex smoker. No formal diagnosis of COPD. he received 80 mg IV Lasix in the ED and diuresed 1500cc and felt significantly better afterwards.  His BNP in the ED was 1100 with normal enzymes.  Says he is back to baseline this morning.      We were asked to admit for work up and evaluation of the above problems. ______________________________________________________________________  DISCHARGE SUMMARY/HOSPITAL COURSE:  for full details see H&P, daily progress notes, labs, consult notes. Shortness of breath: POA  H/o HF with low normal EF in 4088, likely diastolic HF  With cough and congestion likely acute bronchitis and concern for ACEI associated cough  -treated with I and O, daily weight,  IV diuretics  -updated echo shows Left ventricle: The ventricle appeared to be dilated as endocardium not well visualized.  No obvious wall motion abnormalities identified in the views obtained. Wall thickness was at the upper limits of normal. Doppler parameters were consistent with abnormal left ventricular relaxation (grade 1 diastolic dysfunction). -cardiology consult appreciated  -will d/c ACEI and switch to ARB  -prn mucolytics     Type 2 diabetes mellitus  -diet controlled     Hypertension, POA  -resume her home medications     Chronic pain syndrome:   -continue Methadone, dose confirmed     Chronic leg edema, right left  Chronic right foot ulcer  Is being treated at [place] nad has a CT schedule as outpt  -on lasix  -PT/OT and wound care consults evaluated     Morbid obesity  Body mass index is 40.83 kg/(m^2). Code Status: full  Surrogate Decision Maker: does not want to decide right now  Baseline: independent      _______________________________________________________________________  Patient seen and examined by me on discharge day. Pertinent Findings:  Gen:    Not in distress  Chest: Clear lungs  CVS:   Regular rhythm. No edema  Abd:  Soft, not distended, not tender  Neuro:  Alert, cn 2-12 grossly intact  _______________________________________________________________________  DISCHARGE MEDICATIONS:   Current Discharge Medication List      START taking these medications    Details   losartan (COZAAR) 50 mg tablet Take 1 Tab by mouth daily. Qty: 30 Tab, Refills: 1         CONTINUE these medications which have NOT CHANGED    Details   aspirin 81 mg chewable tablet Take 81 mg by mouth daily. White Petrolatum (ALOE VESTA) 43 % oint by Apply Externally route daily as needed (rash). polyvinyl alcohol (LIQUIFILM TEARS) 1.4 % ophthalmic solution Administer 1 Drop to both eyes four (4) times daily as needed (dry eye). ferrous sulfate 325 mg (65 mg iron) tablet Take 325 mg by mouth daily. gabapentin (NEURONTIN) 600 mg tablet Take 600 mg by mouth nightly. pravastatin (PRAVACHOL) 40 mg tablet Take 40 mg by mouth nightly. OTHER,NON-FORMULARY, Apply  to affected area daily. Sodium lactate-urea 5%      cholecalciferol (VITAMIN D3) 1,000 unit tablet Take 3,000 Units by mouth daily. furosemide (LASIX) 40 mg tablet Take 40 mg by mouth daily. latanoprost (XALATAN) 0.005 % ophthalmic solution Administer 1 drop to both eyes nightly. methadone (DOLOPHINE) 10 mg tablet Take 20 mg by mouth two (2) times a day. STOP taking these medications       lisinopril (PRINIVIL, ZESTRIL) 40 mg tablet Comments:   Reason for Stopping:         hydrOXYzine pamoate (VISTARIL) 25 mg capsule Comments:   Reason for Stopping:               My Recommended Diet, Activity, Wound Care, and follow-up labs are listed in the patient's Discharge Insturctions which I have personally completed and reviewed. _______________________________________________________________________  DISPOSITION:    Home with Family: x   Home with HH/PT/OT/RN:    SNF/LTC:    SHARON:    OTHER:        Condition at Discharge:  Stable  _______________________________________________________________________  Follow up with:   PCP : Mikal Crum MD  Follow-up Information     Follow up With Details Comments 500 Yasemin Turner NP Go on 7/10/2017 Your appointment is scheduled for 7/10/17 at [address]  [phone]      [facility] Office on [address] [facility]. will be contacted by a health  to schedule a home visit. [address] [place] [address] will be contacted with your [facility] and Sleep Medicine appointments within 48 hours.  [address] [place] [address] [phone]    Donovan Go on 7/13/2017 Your appointment is scheduled for 7/13/17 at 1:15pm. [address] [place] [address]    Vickie Albert 70  [address]  [phone] Total time in minutes spent coordinating this discharge (includes going over instructions, follow-up, prescriptions, and preparing report for sign off to her PCP) :  40 minutes    Signed:  Shelly Sanchez MD

## 2017-07-06 NOTE — PROGRESS NOTES
Spiritual Care Partner Volunteer visited patient in Med Surg Unit on 7/6/17.   Documented by:  [address] [place] ([id number])

## 2017-07-06 NOTE — INTERDISCIPLINARY ROUNDS
CM rounded with IDT and discussed patient's care. CM will assist with discharge planning. At the time of discharge patient will need to utilize [facility]. CM contacted [facility] to schedule patient's appointment with the [facility] and Sleep Medicine. Patient will be contacted within 48 hours with his appointments. CM collaborated with patient's Richard Parcel, regarding discharge planning. Patient will follow up with Jazzy Lee on 7/13/17 at 1:15pm at the [facility] location.     [address]

## 2017-07-06 NOTE — PROGRESS NOTES
Hospitalist Progress Note    NAME: Ze Lauren   :  1935   MRN:  [id number]       Assessment / Plan:    Shortness of breath: POA  H/o HF with low normal EF in 7835, likely diastolic HF  -I and O  -daily weight  -on IV diuretics  -updated echo, pending  -cardiology consult, pending  -prn mucolytics    Type 2 diabetes mellitus:   -diet controlled    Hypertension, POA  -resume her home medications    Chronic pain syndrome:   -continue Methadone, dose confirmed    Chronic leg edema, right left  Chronic right foot ulcer  Is being treated at [place] nad has a CT schedule as outpt  -on lasix  -PT/OT eval    Morbid obesity  Body mass index is 40.83 kg/(m^2). Code Status: full  Surrogate Decision Maker: does not want to decide right now     DVT Prophylaxis: lovenox  GI Prophylaxis: not indicated     Baseline: independent         Subjective:     Chief Complaint / Reason for Physician Visit  \"cough\". Discussed with RN events overnight. Review of Systems:  Symptom Y/N Comments  Symptom Y/N Comments   Fever/Chills n   Chest Pain n    Poor Appetite n   Edema y    Cough y   Abdominal Pain n    Sputum y   Joint Pain n    SOB/GALVAN n   Pruritis/Rash     Nausea/vomit n   Tolerating PT/OT y    Diarrhea    Tolerating Diet y    Constipation    Other       Could NOT obtain due to:      Objective:     VITALS:   Last 24hrs VS reviewed since prior progress note.  Most recent are:  Patient Vitals for the past 24 hrs:   Temp Pulse Resp BP SpO2   17 0740 98.4 °F (36.9 °C) 61 18 (!) 174/95 96 %   17 0508 98 °F (36.7 °C) 80 17 (!) 141/94 95 %   17 2319 98 °F (36.7 °C) 83 17 (!) 198/114 96 %   17 1938 98.2 °F (36.8 °C) 77 17 (!) 182/93 93 %   17 1548 98.2 °F (36.8 °C) 71 18 (!) 157/106 95 %   17 1110 96.5 °F (35.8 °C) 63 18 (!) 162/98 96 %       Intake/Output Summary (Last 24 hours) at 17 0908  Last data filed at 17 0703   Gross per 24 hour   Intake              590 ml   Output 1800 ml   Net            -1210 ml        PHYSICAL EXAM:  General: WD, WN. Alert, cooperative, no acute distress    EENT:  EOMI. Anicteric sclerae. MMM  Resp:  CTA bilaterally, no wheezing or rales. No accessory muscle use  CV:  Regular  rhythm,  No edema  GI:  Soft, Non distended, Non tender.  +Bowel sounds  Neurologic:  Alert and oriented X 3, normal speech,   Psych:   Good insight. Not anxious nor agitated  Skin:  No rashes. No jaundice    Reviewed most current lab test results and cultures  YES  Reviewed most current radiology test results   YES  Review and summation of old records today    NO  Reviewed patient's current orders and MAR    YES  PMH/ reviewed - no change compared to H&P  ________________________________________________________________________  Care Plan discussed with:    Comments   Patient     Family      RN     Care Manager     Consultant                        Multidiciplinary team rounds were held today with , nursing, pharmacist and clinical coordinator. Patient's plan of care was discussed; medications were reviewed and discharge planning was addressed. ________________________________________________________________________  Total NON critical care TIME:  38   Minutes    Total CRITICAL CARE TIME Spent:   Minutes non procedure based      Comments   >50% of visit spent in counseling and coordination of care     ________________________________________________________________________  Rogelio Perze MD     Procedures: see electronic medical records for all procedures/Xrays and details which were not copied into this note but were reviewed prior to creation of Plan. LABS:  I reviewed today's most current labs and imaging studies.   Pertinent labs include:  Recent Labs      07/04/17   1754   WBC  6.1   HGB  10.7*   HCT  35.7*   PLT  294     Recent Labs      07/05/17   0251  07/04/17   1754   NA  143  141   K  3.7  3.9   CL  103  103   CO2  33*  32   GLU  103*  104* BUN  16  13   CREA  1.25  1.24   CA  8.2*  8.0*   MG  1.9   --    ALB   --   3.1*   TBILI   --   0.3   SGOT   --   17   ALT   --   24       Signed: Gabino Naylor MD

## 2017-07-06 NOTE — WOUND CARE
Wound care consult from the floor for RT leg wound. Chart reviewed and assessed the patient. This an 81year old male patient admitted for Shortness of Breath and Chest pain. Wheel Chair bound uses power wheel chair to move around. Has other medical issues and and a Chronic non healing wound in the  dorsum of RT foot  And toes. The area has  Hyperkeratosis and very rough scabbed area. He is been treated in the [address] [place] [address] for his wound and still going there once in 15 days. He obese and difficult for him to do the dressing for himself. He had wrap on the foot and lower leg. Yellow discoloration of the dressing noted from the drainage from the wound. Moderate amount of drainage noted with foul smell. Between the toes it is cracked and black  b discoloration noted on the leg    Treatment done today:  Removed the wrap and washed his foot and leg with warm water. Washed between the toes and removed the dried drainage and dirt from  foot. Again cleaned the leg with Carraklenz wound cleanser  Thoroughly and dried gently with 4 x4. Applied  Carrasyn gel to the dry and rough areas and placed Vaseline guaze over the wound to prevent it from sticking to the dressing. Applied Bulky dressing and secured it with ACE bandage for comfort. Patient tolerated the procedure well. Patient will be discharged and he will go to [address] [place] [address] for follow up care.     Abdiel Bolivar RN, [facility]

## 2017-07-07 ENCOUNTER — PATIENT OUTREACH (OUTPATIENT)
Dept: INTERNAL MEDICINE CLINIC | Age: 82
End: 2017-07-07

## 2017-07-07 NOTE — PROGRESS NOTES
This writer has attempted to reach patient who is listed on discharge [facility]) report dated 7/6/17. Patient was discharged from [facility] for Resp Distress/Wound Care. NN was unable to perform post hospital discharge assessment as pt wasn't available via telephone. This writer was not able to leave a message on voice mail. This writer will attempt to contact pt at a later time to complete post-discharge assessment.

## 2017-07-07 NOTE — PROGRESS NOTES
Care Management Interventions  PCP Verified by CM: Yes  Palliative Care Consult (Criteria: CHF and RRAT>21): No  Mode of Transport at Discharge: Other (see comment) (Aminah Lofton courtesy [place])  Transition of Care Consult (CM Consult): Discharge Planning  Discharge Durable Medical Equipment: No (Patient has motorized wheelchair)  Physical Therapy Consult: No  Occupational Therapy Consult: No  Speech Therapy Consult: No  Current Support Network: Lives Alone  Confirm Follow Up Transport: Self  Discharge Location  Discharge Placement: Home with outpatient services    Patient will follow up with NP Yvon Ruiz on 7/10/17, Donald Rider on 7/13/17, and he will be contacted by [facility] within 48 hours with his [facility] and sleep study appointments.     [address]

## 2017-07-11 ENCOUNTER — TELEPHONE (OUTPATIENT)
Dept: CASE MANAGEMENT | Age: 82
End: 2017-07-11

## 2017-07-11 NOTE — TELEPHONE ENCOUNTER
Care manager called patient to follow up. Identified as correct patient. Patient states that he has all needed prescriptions and completed a visit with [facility] . He has follow up at the [place] tomorrow. He does not have any questions for care management at this time.     BRIAN Love  [phone]

## 2017-07-18 ENCOUNTER — PATIENT OUTREACH (OUTPATIENT)
Dept: INTERNAL MEDICINE CLINIC | Age: 82
End: 2017-07-18

## 2017-07-18 NOTE — PROGRESS NOTES
Patient listed on discharge [facility]) report on 17. Patient discharged from [facility] for Resp Distress/CHF. NN contacted the patient to perform post hospital discharge assessment. Verified  and address with patient as identifiers. Provided introduction to self, and explanation of the NN role. Today Mr//Ms Ruiz Barnett reports he is doing well. This past admission scared him some. He now is eating better (fresh and frozen fruits and veg). He denies pain, fever and reports his blood glucose as being controlled; No related complications. He is not monitoring his BP at home as he needs batteries for his device. Mr Ruiz Barnett goes on to say that his right foot wound is healing much better after his session with wound care nurse during admission. He lives alone but the [address] [place] [address] has arranged for an aide to come into his home three days a week to help with household matters only. All newest medications have been obtained- no reports of side effects. Mr Ruiz Barnett says that he has followed up with [facility] post discharge as well as meeting with [facility]. This writer has spoken with Kahlil Rowland who will fax to this writers attention notes from that home visit. Mr Ruiz Barnett is scheduled for a sleep study at the [address] [place] [address] on  and he will be in to see PCP on tomorrow. This writer encouraged pt to rest, monitor glucose and bp daily and to contact office even after hours to speak with on call physician to report non life threatening symptoms and that physician will assist in deciding if a trip to the ED is necessary. Reviewed red flags and discharge instructions with patient who verbalizes understanding. Patient given an opportunity to ask questions. No other clinical/social/functional needs noted. The patient agrees to contact the PCP office for questions related to her healthcare. The patient expressed thanks, offered no additional questions and ended the call.

## 2017-07-18 NOTE — Clinical Note
Hi Dr Ernesto Jacques, This pt will be in tomorrow for his first PCP hosp f/u. He missed his 7/10 with Nikolas. He has had an in home visit with [facility] and I have that health  fxing me her notes. Also he reports having seen Duy Nguyễn on 7/13 I'm waiting on a call to see if we have access to those notes or not. If so I will get them so that you have the most recent data before he comes in tomorrow.

## 2017-07-19 ENCOUNTER — OFFICE VISIT (OUTPATIENT)
Dept: INTERNAL MEDICINE CLINIC | Age: 82
End: 2017-07-19

## 2017-07-19 VITALS
HEIGHT: 67 IN | RESPIRATION RATE: 18 BRPM | DIASTOLIC BLOOD PRESSURE: 82 MMHG | WEIGHT: 273 LBS | HEART RATE: 75 BPM | SYSTOLIC BLOOD PRESSURE: 136 MMHG | BODY MASS INDEX: 42.85 KG/M2 | OXYGEN SATURATION: 96 % | TEMPERATURE: 97.5 F

## 2017-07-19 DIAGNOSIS — I11.0 HYPERTENSIVE HEART DISEASE WITH CONGESTIVE HEART FAILURE (HCC): ICD-10-CM

## 2017-07-19 DIAGNOSIS — I50.32 CHRONIC CONGESTIVE HEART FAILURE WITH LEFT VENTRICULAR DIASTOLIC DYSFUNCTION (HCC): ICD-10-CM

## 2017-07-19 DIAGNOSIS — Z23 ENCOUNTER FOR IMMUNIZATION: ICD-10-CM

## 2017-07-19 DIAGNOSIS — E78.5 HYPERLIPIDEMIA, UNSPECIFIED HYPERLIPIDEMIA TYPE: ICD-10-CM

## 2017-07-19 DIAGNOSIS — E11.69 TYPE 2 DIABETES MELLITUS WITH OTHER SPECIFIED COMPLICATION (HCC): Primary | ICD-10-CM

## 2017-07-19 LAB — HBA1C MFR BLD HPLC: 6.3 %
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
## 2017-07-19 NOTE — MR AVS SNAPSHOT
Visit Information Date & Time Provider Department Dept. Phone Encounter #  
 7/19/2017  3:00 PM Rosangela Mcdonald, [address] [id number] Follow-up Instructions Return if symptoms worsen or fail to improve. Upcoming Health Maintenance Date Due  
 FOOT EXAM Q1 6/8/1945 DTaP/Tdap/Td series (1 - Tdap) 6/8/1957 ZOSTER VACCINE AGE 60> 6/8/1996 MICROALBUMIN Q1 4/6/2013 EYE EXAM RETINAL OR DILATED Q1 7/11/2015 Pneumococcal 65+ Low/Medium Risk (2 of 2 - PCV13) 11/14/2015 GLAUCOMA SCREENING Q2Y 7/11/2016 MEDICARE YEARLY EXAM 12/30/2016 HEMOGLOBIN A1C Q6M 4/28/2017 INFLUENZA AGE 9 TO ADULT 8/1/2017 LIPID PANEL Q1 10/17/2017 Allergies as of 7/19/2017  Review Complete On: 7/19/2017 By: Agnieszka Mena Severity Noted Reaction Type Reactions Ace Inhibitors  07/06/2017    Cough Ibuprofen  06/01/2010    Rash Current Immunizations  Reviewed on 10/17/2016 Name Date Influenza High Dose Vaccine PF 10/17/2016 Influenza Vaccine PF 11/14/2014  1:44 PM  
 Pneumococcal Polysaccharide (PPSV-23) 11/14/2014  1:45 PM  
  
 Not reviewed this visit You Were Diagnosed With   
  
 Codes Comments Type 2 diabetes mellitus with other specified complication (HCC)    -  Primary ICD-10-CM: E11.69 ICD-9-CM: 250.80 Chronic congestive heart failure with left ventricular diastolic dysfunction (HCC)     ICD-10-CM: I50.32 
ICD-9-CM: 428.32 Hypertensive heart disease with congestive heart failure ([facility]     ICD-10-CM: I11.0 ICD-9-CM: 402.91, 428.0 Hyperlipidemia, unspecified hyperlipidemia type     ICD-10-CM: E78.5 ICD-9-CM: 272.4 Encounter for immunization     ICD-10-CM: A18 ICD-9-CM: V03.89 Vitals BP Pulse Temp Resp Height(growth percentile) Weight(growth percentile) 136/82 (BP 1 Location: Left arm, BP Patient Position: Sitting) 75 97.5 °F (36.4 °C) (Oral) 18 5' 7\" (1.702 m) 273 lb (123.8 kg) SpO2 BMI Smoking Status 96% 42.76 kg/m2 Never Smoker Vitals History BMI and BSA Data Body Mass Index Body Surface Area 42.76 kg/m 2 2.42 m 2 Preferred Pharmacy Pharmacy Name Phone [email or web address] [address] [phone] Your Updated Medication List  
  
   
This list is accurate as of: 7/19/17  4:26 PM.  Always use your most recent med list.  
  
  
  
  
 ALOE VESTA 43 % Oint Generic drug:  White Petrolatum  
by Apply Externally route daily as needed (rash). aspirin 81 mg chewable tablet Take 81 mg by mouth daily. cholecalciferol 1,000 unit tablet Commonly known as:  VITAMIN D3 Take 3,000 Units by mouth daily. ferrous sulfate 325 mg (65 mg iron) tablet Take 325 mg by mouth daily. furosemide 40 mg tablet Commonly known as:  LASIX Take 40 mg by mouth daily. gabapentin 600 mg tablet Commonly known as:  NEURONTIN Take 600 mg by mouth nightly. latanoprost 0.005 % ophthalmic solution Commonly known as:  Roger Labrum Administer 1 drop to both eyes nightly. losartan 50 mg tablet Commonly known as:  COZAAR Take 1 Tab by mouth daily. methadone 10 mg tablet Commonly known as:  DOLOPHINE Take 20 mg by mouth two (2) times a day. OTHER(NON-FORMULARY) Apply  to affected area daily. Sodium lactate-urea 5%  
  
 polyvinyl alcohol 1.4 % ophthalmic solution Commonly known as:  Maribell Marcel Administer 1 Drop to both eyes four (4) times daily as needed (dry eye). PRAVACHOL 40 mg tablet Generic drug:  pravastatin Take 40 mg by mouth nightly. We Performed the Following AMB POC HEMOGLOBIN A1C [70593 CPT(R)] Follow-up Instructions Return if symptoms worsen or fail to improve. Patient Instructions Low Sodium Diet (2,000 Milligram): Care Instructions Your Care Instructions Too much sodium causes your body to hold on to extra water. This can raise your blood pressure and force your heart and kidneys to work harder. In very serious cases, this could cause you to be put in the hospital. It might even be life-threatening. By limiting sodium, you will feel better and lower your risk of serious problems. The most common source of sodium is salt. People get most of the salt in their diet from canned, prepared, and packaged foods. Fast food and restaurant meals also are very high in sodium. Your doctor will probably limit your sodium to less than 2,000 milligrams (mg) a day. This limit counts all the sodium in prepared and packaged foods and any salt you add to your food. Follow-up care is a key part of your treatment and safety. Be sure to make and go to all appointments, and call your doctor if you are having problems. It's also a good idea to know your test results and keep a list of the medicines you take. How can you care for yourself at home? Read food labels · Read labels on cans and food packages. The labels tell you how much sodium is in each serving. Make sure that you look at the serving size. If you eat more than the serving size, you have eaten more sodium. · Food labels also tell you the Percent Daily Value for sodium. Choose products with low Percent Daily Values for sodium. · Be aware that sodium can come in forms other than salt, including monosodium glutamate (MSG), sodium citrate, and sodium bicarbonate (baking soda). MSG is often added to Asian food. When you eat out, you can sometimes ask for food without MSG or added salt. Buy low-sodium foods · Buy foods that are labeled \"unsalted\" (no salt added), \"sodium-free\" (less than 5 mg of sodium per serving), or \"low-sodium\" (less than 140 mg of sodium per serving). Foods labeled \"reduced-sodium\" and \"light sodium\" may still have too much sodium. Be sure to read the label to see how much sodium you are getting. · Buy fresh vegetables, or frozen vegetables without added sauces. Buy low-sodium versions of canned vegetables, soups, and other canned goods. Prepare low-sodium meals · Cut back on the amount of salt you use in cooking. This will help you adjust to the taste. Do not add salt after cooking. One teaspoon of salt has about 2,300 mg of sodium. · Take the salt shaker off the table. · Flavor your food with garlic, lemon juice, onion, vinegar, herbs, and spices. Do not use soy sauce, lite soy sauce, steak sauce, onion salt, garlic salt, celery salt, mustard, or ketchup on your food. · Use low-sodium salad dressings, sauces, and ketchup. Or make your own salad dressings and sauces without adding salt. · Use less salt (or none) when recipes call for it. You can often use half the salt a recipe calls for without losing flavor. Other foods such as rice, pasta, and grains do not need added salt. · Rinse canned vegetables, and cook them in fresh water. This removes somebut not allof the salt. · Avoid water that is naturally high in sodium or that has been treated with water softeners, which add sodium. Call your local water company to find out the sodium content of your water supply. If you buy bottled water, read the label and choose a sodium-free brand. Avoid high-sodium foods · Avoid eating: ¨ Smoked, cured, salted, and canned meat, fish, and poultry. ¨ Ham, dumont, hot dogs, and luncheon meats. ¨ Regular, hard, and processed cheese and regular peanut butter. ¨ Crackers with salted tops, and other salted snack foods such as pretzels, chips, and salted popcorn. ¨ Frozen prepared meals, unless labeled low-sodium. ¨ Canned and dried soups, broths, and bouillon, unless labeled sodium-free or low-sodium. ¨ Canned vegetables, unless labeled sodium-free or low-sodium. ¨ [place] fries, pizza, tacos, and other fast foods.  
¨ Pickles, olives, ketchup, and other condiments, especially soy sauce, unless labeled sodium-free or low-sodium. Where can you learn more? Go to http://liz-josé luis.info/. Enter B025 in the search box to learn more about \"Low Sodium Diet (2,000 Milligram): Care Instructions. \" Current as of: July 26, 2016 Content Version: 11.3 © 4116-4272 [employer]. Care instructions adapted under license by [employer] (which disclaims liability or warranty for this information). If you have questions about a medical condition or this instruction, always ask your healthcare professional. [place] [address] any warranty or liability for your use of this information. Introducing [facility]! [address] introduces [employer] patient portal. Now you can access parts of your medical record, email your doctor's office, and request medication refills online. 1. In your internet browser, go to [email or web address] 2. Click on the First Time User? Click Here link in the Sign In box. You will see the New Member Sign Up page. 3. Enter your [employer] Access Code exactly as it appears below. You will not need to use this code after youve completed the sign-up process. If you do not sign up before the expiration date, you must request a new code. · [employer] Access Code: [email or web address] Expires: 10/4/2017  3:33 PM 
 
4. Enter the last four digits of your Social Security Number (xxxx) and Date of Birth (mm/dd/yyyy) as indicated and click Submit. You will be taken to the next sign-up page. 5. Create a Hyletet ID. This will be your [employer] login ID and cannot be changed, so think of one that is secure and easy to remember. 6. Create a [employer] password. You can change your password at any time. 7. Enter your Password Reset Question and Answer. This can be used at a later time if you forget your password. 8. Enter your e-mail address.  You will receive e-mail notification when new information is available in [employer]. 9. Click Sign Up. You can now view and download portions of your medical record. 10. Click the Download Summary menu link to download a portable copy of your medical information. If you have questions, please visit the Frequently Asked Questions section of the [employer] website. Remember, [employer] is NOT to be used for urgent needs. For medical emergencies, dial 911. Now available from your iPhone and Android! Please provide this summary of care documentation to your next provider. Your primary care clinician is listed as Dedra Javed. If you have any questions after today's visit, please call [phone].

## 2017-07-19 NOTE — PATIENT INSTRUCTIONS

## 2017-07-19 NOTE — PROGRESS NOTES
1. Have you been to the ER, urgent care clinic since your last visit? Hospitalized since your last visit? Yes When: 7/4/17 [facility] CHF. 2. Have you seen or consulted any other health care providers outside of the [address] [place] [address] since your last visit? Include any pap smears or colon screening.  No.

## 2017-07-26 ENCOUNTER — PATIENT OUTREACH (OUTPATIENT)
Dept: INTERNAL MEDICINE CLINIC | Age: 82
End: 2017-07-26

## 2017-07-27 ENCOUNTER — PATIENT OUTREACH (OUTPATIENT)
Dept: INTERNAL MEDICINE CLINIC | Age: 82
End: 2017-07-27

## 2017-07-27 NOTE — PROGRESS NOTES
This writer receives a call from [employer],  at [employer] trying to identify pt's needs. This writer advises [place] that per the report obtained from [facility] the pt needs a new glucometer, could benefit from a phone that was more suitable for his eyes (Yuly). This writer also advises [place] that per my conversation with Mr Mimi Esteves 1) he has recently spoken with [facility]  Elio Donato (on vacation) and received a scale a few days ago 2) he has not had his sleep study but will be following up with the [facility] about that today. [employer] says he will reach out to the pt. He also indicates the pt has not been seen in their clinic since hospital discharge. He mentions that when the pt comes in he can be given a new meter. He also questions if [facility] will be helping Mr Mimi Esteves with the phone matter. This writer will continue to follow.

## 2017-08-02 ENCOUNTER — PATIENT OUTREACH (OUTPATIENT)
Dept: INTERNAL MEDICINE CLINIC | Age: 82
End: 2017-08-02

## 2017-08-02 NOTE — PROGRESS NOTES
This writer attempts to reach pt as a follow up to last conversation. Call goes unanswered. This writer is unable to leave a message on pt's machine. Other contact number is not in service. This writer will reach out to pt in 1-2 business days.

## 2017-08-07 ENCOUNTER — PATIENT OUTREACH (OUTPATIENT)
Dept: INTERNAL MEDICINE CLINIC | Age: 82
End: 2017-08-07

## 2017-08-07 NOTE — PROGRESS NOTES
This writer reaches out to Mr Pérez Aquino as a final follow up to initial conversation. Today Mr Pérez Aquino tells this writer he is generally doing well aside from his wound. He is now working with a Podiatrist and wound care team at the [place]. He has not scheduled an appointment with [facility] for dental services but plans to call soon. On 8/6 he received a new cell phone (number added to chart) and hopes this will help him as he has trouble seeing and accessing his vmail. He is reminded that when he attends a visit at [facility] he can be given a glucometer at no charge. His sleep study has not been rescheduled. He has experienced shortness of breath once resulting in his use of his nebulizer with relief. [facility] This writer reminds Mr Pérez Aquino of contact number should he need additional assistance in the future. No new case management issues have been identified at this time, this writer will close this episode.
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
## 2019-05-02 ENCOUNTER — HOME HEALTH ADMISSION (OUTPATIENT)
Dept: HOME HEALTH SERVICES | Facility: HOME HEALTH | Age: 84
End: 2019-05-02
Payer: MEDICARE

## 2019-05-03 ENCOUNTER — HOME CARE VISIT (OUTPATIENT)
Dept: HOME HEALTH SERVICES | Facility: HOME HEALTH | Age: 84
End: 2019-05-03

## 2019-05-06 ENCOUNTER — HOME CARE VISIT (OUTPATIENT)
Dept: HOME HEALTH SERVICES | Facility: HOME HEALTH | Age: 84
End: 2019-05-06

## 2019-05-07 ENCOUNTER — HOME CARE VISIT (OUTPATIENT)
Dept: SCHEDULING | Facility: HOME HEALTH | Age: 84
End: 2019-05-07
Payer: MEDICARE

## 2019-05-07 ENCOUNTER — HOME CARE VISIT (OUTPATIENT)
Dept: HOME HEALTH SERVICES | Facility: HOME HEALTH | Age: 84
End: 2019-05-07

## 2019-05-07 VITALS
HEIGHT: 67 IN | DIASTOLIC BLOOD PRESSURE: 87 MMHG | SYSTOLIC BLOOD PRESSURE: 138 MMHG | SYSTOLIC BLOOD PRESSURE: 148 MMHG | TEMPERATURE: 97.6 F | WEIGHT: 273 LBS | HEART RATE: 85 BPM | DIASTOLIC BLOOD PRESSURE: 80 MMHG | OXYGEN SATURATION: 98 % | HEART RATE: 72 BPM | BODY MASS INDEX: 42.85 KG/M2 | OXYGEN SATURATION: 100 % | TEMPERATURE: 98.3 F | RESPIRATION RATE: 16 BRPM

## 2019-05-07 PROCEDURE — A6216 NON-STERILE GAUZE<=16 SQ IN: HCPCS

## 2019-05-07 PROCEDURE — A6446 CONFORM BAND S W>=3" <5"/YD: HCPCS

## 2019-05-07 PROCEDURE — A6260 WOUND CLEANSER ANY TYPE/SIZE: HCPCS

## 2019-05-07 PROCEDURE — G0299 HHS/HOSPICE OF RN EA 15 MIN: HCPCS

## 2019-05-07 PROCEDURE — G0151 HHCP-SERV OF PT,EA 15 MIN: HCPCS

## 2019-05-07 PROCEDURE — A6449 LT COMPRES BAND >=3" <5"/YD: HCPCS

## 2019-05-07 PROCEDURE — A6456 ZINC PASTE BAND W >=3"<5"/YD: HCPCS

## 2019-05-07 PROCEDURE — A6454 SELF-ADHER BAND W>=3" <5"/YD: HCPCS

## 2019-05-07 PROCEDURE — 400013 HH SOC

## 2019-05-07 PROCEDURE — A6252 ABSORPT DRG >16 <=48 W/O BDR: HCPCS

## 2019-05-08 ENCOUNTER — HOME CARE VISIT (OUTPATIENT)
Dept: HOME HEALTH SERVICES | Facility: HOME HEALTH | Age: 84
End: 2019-05-08
Payer: MEDICARE

## 2019-05-08 PROCEDURE — A6446 CONFORM BAND S W>=3" <5"/YD: HCPCS

## 2019-05-08 PROCEDURE — A6252 ABSORPT DRG >16 <=48 W/O BDR: HCPCS

## 2019-05-08 PROCEDURE — A6450 LT COMPRES BAND >=5"/YD: HCPCS

## 2019-05-09 ENCOUNTER — HOME CARE VISIT (OUTPATIENT)
Dept: SCHEDULING | Facility: HOME HEALTH | Age: 84
End: 2019-05-09
Payer: MEDICARE

## 2019-05-09 ENCOUNTER — HOME CARE VISIT (OUTPATIENT)
Dept: HOME HEALTH SERVICES | Facility: HOME HEALTH | Age: 84
End: 2019-05-09

## 2019-05-09 ENCOUNTER — HOME CARE VISIT (OUTPATIENT)
Dept: HOME HEALTH SERVICES | Facility: HOME HEALTH | Age: 84
End: 2019-05-09
Payer: MEDICARE

## 2019-05-09 PROCEDURE — G0155 HHCP-SVS OF CSW,EA 15 MIN: HCPCS

## 2019-05-09 PROCEDURE — G0157 HHC PT ASSISTANT EA 15: HCPCS

## 2019-05-10 ENCOUNTER — HOME CARE VISIT (OUTPATIENT)
Dept: HOME HEALTH SERVICES | Facility: HOME HEALTH | Age: 84
End: 2019-05-10
Payer: MEDICARE

## 2019-05-10 ENCOUNTER — HOME CARE VISIT (OUTPATIENT)
Dept: SCHEDULING | Facility: HOME HEALTH | Age: 84
End: 2019-05-10
Payer: MEDICARE

## 2019-05-10 VITALS
SYSTOLIC BLOOD PRESSURE: 130 MMHG | TEMPERATURE: 97.1 F | OXYGEN SATURATION: 97 % | DIASTOLIC BLOOD PRESSURE: 82 MMHG | HEART RATE: 60 BPM

## 2019-05-13 ENCOUNTER — HOME CARE VISIT (OUTPATIENT)
Dept: SCHEDULING | Facility: HOME HEALTH | Age: 84
End: 2019-05-13
Payer: MEDICARE

## 2019-05-13 ENCOUNTER — HOME CARE VISIT (OUTPATIENT)
Dept: HOME HEALTH SERVICES | Facility: HOME HEALTH | Age: 84
End: 2019-05-13
Payer: MEDICARE

## 2019-05-13 VITALS
HEART RATE: 70 BPM | SYSTOLIC BLOOD PRESSURE: 118 MMHG | DIASTOLIC BLOOD PRESSURE: 64 MMHG | OXYGEN SATURATION: 97 % | TEMPERATURE: 96.1 F

## 2019-05-13 PROCEDURE — G0157 HHC PT ASSISTANT EA 15: HCPCS

## 2019-05-13 PROCEDURE — A6446 CONFORM BAND S W>=3" <5"/YD: HCPCS

## 2019-05-13 PROCEDURE — A6450 LT COMPRES BAND >=5"/YD: HCPCS

## 2019-05-13 PROCEDURE — A6252 ABSORPT DRG >16 <=48 W/O BDR: HCPCS

## 2019-05-14 ENCOUNTER — HOME CARE VISIT (OUTPATIENT)
Dept: SCHEDULING | Facility: HOME HEALTH | Age: 84
End: 2019-05-14
Payer: MEDICARE

## 2019-05-14 VITALS
OXYGEN SATURATION: 98 % | DIASTOLIC BLOOD PRESSURE: 88 MMHG | TEMPERATURE: 97.8 F | HEART RATE: 73 BPM | SYSTOLIC BLOOD PRESSURE: 132 MMHG

## 2019-05-14 PROCEDURE — G0300 HHS/HOSPICE OF LPN EA 15 MIN: HCPCS

## 2019-05-15 ENCOUNTER — HOME CARE VISIT (OUTPATIENT)
Dept: SCHEDULING | Facility: HOME HEALTH | Age: 84
End: 2019-05-15
Payer: MEDICARE

## 2019-05-15 PROCEDURE — G0157 HHC PT ASSISTANT EA 15: HCPCS

## 2019-05-16 ENCOUNTER — HOME CARE VISIT (OUTPATIENT)
Dept: SCHEDULING | Facility: HOME HEALTH | Age: 84
End: 2019-05-16
Payer: MEDICARE

## 2019-05-16 VITALS
HEART RATE: 79 BPM | SYSTOLIC BLOOD PRESSURE: 118 MMHG | OXYGEN SATURATION: 95 % | DIASTOLIC BLOOD PRESSURE: 66 MMHG | TEMPERATURE: 96.4 F

## 2019-05-16 PROCEDURE — G0156 HHCP-SVS OF AIDE,EA 15 MIN: HCPCS

## 2019-05-16 PROCEDURE — G0300 HHS/HOSPICE OF LPN EA 15 MIN: HCPCS

## 2019-05-20 ENCOUNTER — HOME CARE VISIT (OUTPATIENT)
Dept: SCHEDULING | Facility: HOME HEALTH | Age: 84
End: 2019-05-20
Payer: MEDICARE

## 2019-05-20 PROCEDURE — G0157 HHC PT ASSISTANT EA 15: HCPCS

## 2019-05-21 VITALS
DIASTOLIC BLOOD PRESSURE: 72 MMHG | HEART RATE: 76 BPM | TEMPERATURE: 96.1 F | SYSTOLIC BLOOD PRESSURE: 118 MMHG | OXYGEN SATURATION: 95 %

## 2019-05-22 ENCOUNTER — HOME CARE VISIT (OUTPATIENT)
Dept: SCHEDULING | Facility: HOME HEALTH | Age: 84
End: 2019-05-22
Payer: MEDICARE

## 2019-05-22 PROCEDURE — G0300 HHS/HOSPICE OF LPN EA 15 MIN: HCPCS

## 2019-05-23 ENCOUNTER — HOME CARE VISIT (OUTPATIENT)
Dept: SCHEDULING | Facility: HOME HEALTH | Age: 84
End: 2019-05-23
Payer: MEDICARE

## 2019-05-23 PROCEDURE — G0151 HHCP-SERV OF PT,EA 15 MIN: HCPCS

## 2019-05-24 VITALS
TEMPERATURE: 96.4 F | SYSTOLIC BLOOD PRESSURE: 122 MMHG | OXYGEN SATURATION: 95 % | HEART RATE: 69 BPM | DIASTOLIC BLOOD PRESSURE: 68 MMHG

## 2019-05-25 ENCOUNTER — HOME CARE VISIT (OUTPATIENT)
Dept: SCHEDULING | Facility: HOME HEALTH | Age: 84
End: 2019-05-25
Payer: MEDICARE

## 2019-05-25 PROCEDURE — G0299 HHS/HOSPICE OF RN EA 15 MIN: HCPCS

## 2019-05-28 ENCOUNTER — HOME CARE VISIT (OUTPATIENT)
Dept: SCHEDULING | Facility: HOME HEALTH | Age: 84
End: 2019-05-28
Payer: MEDICARE

## 2019-05-28 VITALS
OXYGEN SATURATION: 95 % | DIASTOLIC BLOOD PRESSURE: 84 MMHG | TEMPERATURE: 97.5 F | HEART RATE: 70 BPM | SYSTOLIC BLOOD PRESSURE: 138 MMHG

## 2019-05-28 PROCEDURE — G0300 HHS/HOSPICE OF LPN EA 15 MIN: HCPCS

## 2019-05-31 ENCOUNTER — HOME CARE VISIT (OUTPATIENT)
Dept: SCHEDULING | Facility: HOME HEALTH | Age: 84
End: 2019-05-31
Payer: MEDICARE

## 2019-05-31 PROCEDURE — G0300 HHS/HOSPICE OF LPN EA 15 MIN: HCPCS

## 2019-06-02 VITALS
HEART RATE: 68 BPM | DIASTOLIC BLOOD PRESSURE: 105 MMHG | SYSTOLIC BLOOD PRESSURE: 162 MMHG | RESPIRATION RATE: 18 BRPM | OXYGEN SATURATION: 100 %

## 2019-06-03 ENCOUNTER — HOME CARE VISIT (OUTPATIENT)
Dept: SCHEDULING | Facility: HOME HEALTH | Age: 84
End: 2019-06-03
Payer: MEDICARE

## 2019-06-03 ENCOUNTER — HOME CARE VISIT (OUTPATIENT)
Dept: HOME HEALTH SERVICES | Facility: HOME HEALTH | Age: 84
End: 2019-06-03
Payer: MEDICARE

## 2019-06-06 ENCOUNTER — HOME CARE VISIT (OUTPATIENT)
Dept: SCHEDULING | Facility: HOME HEALTH | Age: 84
End: 2019-06-06
Payer: MEDICARE
